# Patient Record
Sex: MALE | Race: OTHER | HISPANIC OR LATINO | URBAN - METROPOLITAN AREA
[De-identification: names, ages, dates, MRNs, and addresses within clinical notes are randomized per-mention and may not be internally consistent; named-entity substitution may affect disease eponyms.]

---

## 2017-11-02 ENCOUNTER — EMERGENCY (EMERGENCY)
Facility: HOSPITAL | Age: 57
LOS: 1 days | Discharge: HOME | End: 2017-11-02
Attending: EMERGENCY MEDICINE
Payer: MEDICAID

## 2017-11-02 VITALS
OXYGEN SATURATION: 100 % | TEMPERATURE: 98 F | HEIGHT: 64 IN | RESPIRATION RATE: 20 BRPM | HEART RATE: 57 BPM | SYSTOLIC BLOOD PRESSURE: 127 MMHG | DIASTOLIC BLOOD PRESSURE: 81 MMHG | WEIGHT: 145.06 LBS

## 2017-11-02 PROBLEM — Z00.00 ENCOUNTER FOR PREVENTIVE HEALTH EXAMINATION: Status: ACTIVE | Noted: 2017-11-02

## 2017-11-02 PROCEDURE — 93971 EXTREMITY STUDY: CPT | Mod: 26,LT

## 2017-11-02 PROCEDURE — 99284 EMERGENCY DEPT VISIT MOD MDM: CPT | Mod: 25

## 2017-11-02 PROCEDURE — 93971 EXTREMITY STUDY: CPT

## 2017-11-02 PROCEDURE — 99284 EMERGENCY DEPT VISIT MOD MDM: CPT

## 2017-11-02 PROCEDURE — T1013: CPT

## 2017-11-02 RX ORDER — METHOCARBAMOL 500 MG/1
2 TABLET, FILM COATED ORAL
Qty: 30 | Refills: 0 | OUTPATIENT
Start: 2017-11-02 | End: 2017-11-07

## 2017-11-02 RX ORDER — IBUPROFEN 200 MG
1 TABLET ORAL
Qty: 20 | Refills: 0 | OUTPATIENT
Start: 2017-11-02 | End: 2017-11-07

## 2017-11-02 RX ORDER — IBUPROFEN 200 MG
600 TABLET ORAL ONCE
Qty: 0 | Refills: 0 | Status: COMPLETED | OUTPATIENT
Start: 2017-11-02 | End: 2017-11-02

## 2017-11-02 RX ORDER — METHOCARBAMOL 500 MG/1
750 TABLET, FILM COATED ORAL ONCE
Qty: 0 | Refills: 0 | Status: COMPLETED | OUTPATIENT
Start: 2017-11-02 | End: 2017-11-02

## 2017-11-02 RX ADMIN — Medication 600 MILLIGRAM(S): at 12:15

## 2017-11-02 RX ADMIN — METHOCARBAMOL 750 MILLIGRAM(S): 500 TABLET, FILM COATED ORAL at 12:16

## 2017-11-02 NOTE — ED STATDOCS - MUSCULOSKELETAL FINDINGS, MLM
normal range of motion/motor intact/muscular tenderness to L hamstring. CALF TENDERNESS. no tenderness to buttock, lumbar, thoracic or cervical spine./neck supple/TENDERNESS

## 2017-11-02 NOTE — ED STATDOCS - OBJECTIVE STATEMENT
56 y/o M pt presents to the ED c/o LLE pain intermittent x5 days, worsening 1 day ago. Localizes the pain to the posterior L thigh. Pain radiates down his L leg. Taking acetaminophen for the pain with mild relief. Icing the location with mild relief. States that the pain worsened while at work, stands a lot during work. Denies fever, chills, SOB, back pain, numbness, tingling or any other complaints. NKDA. 56 y/o M pt presents to the ED c/o LLE pain intermittent x5 days, worsening 1 day ago.  He denies injury, fall or trauma.  The pain is localized to his calf and the posterior aspect ot the left thigh.  He has been taking tylenol for pain with mild relief.  The pain is worse when ambulating and when walking at work.  He denies fever, back pain, numbness, tingling or any other complaints. NKDA.

## 2017-11-02 NOTE — ED STATDOCS - ATTENDING CONTRIBUTION TO CARE
I, Prabha Wakefield, performed the initial face to face bedside interview with this patient regarding history of present illness, review of symptoms and relevant past medical, social and family history.  I completed an independent physical examination.  I was the initial provider who evaluated this patient.  The history, relevant review of systems, past medical and surgical history, medical decision making, and physical examination was documented by the scribe in my presence and I attest to the accuracy of the documentation.  I have signed out the follow up of any pending tests (i.e. labs, radiological studies) to the ACP.  I have communicated the patient’s plan of care and disposition with the ACP.

## 2017-11-02 NOTE — ED ADULT TRIAGE NOTE - CHIEF COMPLAINT QUOTE
Patient arrived to ED today with c/o pain from his left buttocks to his left leg.  Patient denies injury.

## 2017-11-02 NOTE — ED STATDOCS - PROGRESS NOTE DETAILS
Pt US negative for DVT. Pt states that he feels a lot better with pain medication . Pt will continue with pain medication and F/u with PCP or medical clinic as discussed.

## 2017-11-02 NOTE — ED STATDOCS - CARE PLAN
Principal Discharge DX:	Musculoskeletal thigh pain, left  Instructions for follow-up, activity and diet:	Continue with medication as discussed

## 2019-03-19 NOTE — ED STATDOCS - CARDIAC, MLM
----- Message from Delia Saunders sent at 3/19/2019 11:25 AM CDT -----  Contact: Pt  Pt is requesting that all his medication be sent to [facility] mail order says it is cheaper     Pt can be reached at [phone]       normal rate, regular rhythm, and no murmur.

## 2019-05-20 ENCOUNTER — EMERGENCY (EMERGENCY)
Facility: HOSPITAL | Age: 59
LOS: 1 days | Discharge: HOME | End: 2019-05-20
Attending: EMERGENCY MEDICINE
Payer: MEDICAID

## 2019-05-20 VITALS
TEMPERATURE: 99 F | OXYGEN SATURATION: 99 % | WEIGHT: 164.91 LBS | DIASTOLIC BLOOD PRESSURE: 82 MMHG | SYSTOLIC BLOOD PRESSURE: 139 MMHG | HEIGHT: 62.99 IN | RESPIRATION RATE: 18 BRPM | HEART RATE: 61 BPM

## 2019-05-20 PROCEDURE — 99283 EMERGENCY DEPT VISIT LOW MDM: CPT | Mod: 25

## 2019-05-20 PROCEDURE — 99283 EMERGENCY DEPT VISIT LOW MDM: CPT

## 2019-05-20 PROCEDURE — 96372 THER/PROPH/DIAG INJ SC/IM: CPT

## 2019-05-20 PROCEDURE — T1013: CPT

## 2019-05-20 RX ORDER — LACTOBACILLUS ACIDOPHILUS 100MM CELL
2 CAPSULE ORAL
Qty: 28 | Refills: 0
Start: 2019-05-20 | End: 2019-06-02

## 2019-05-20 RX ORDER — IBUPROFEN 200 MG
1 TABLET ORAL
Qty: 20 | Refills: 0
Start: 2019-05-20 | End: 2019-05-24

## 2019-05-20 RX ORDER — KETOROLAC TROMETHAMINE 30 MG/ML
60 SYRINGE (ML) INJECTION ONCE
Refills: 0 | Status: DISCONTINUED | OUTPATIENT
Start: 2019-05-20 | End: 2019-05-20

## 2019-05-20 RX ADMIN — Medication 60 MILLIGRAM(S): at 15:44

## 2019-05-20 RX ADMIN — Medication 300 MILLIGRAM(S): at 15:44

## 2019-05-20 NOTE — ED ADULT NURSE NOTE - OBJECTIVE STATEMENT
assumed pt care at 1418.  pt reports left sided pain in his tooth/neck/ear and head X 3 weeks. Can't sleep due to pain.

## 2019-05-20 NOTE — ED STATDOCS - OBJECTIVE STATEMENT
This is a 59 year old male with c/o L lower molar dental pain x 3 weeks.  He reports was evaluated by dentist and was told needed a tooth extracted.  He notes was placed on antibiotics initially after evaluation and completed course doesn't remember the name.  He reports was unable to tolerate tooth removal and has differed getting procedure done.  He notes was pain free up until today.  He denies any fevers, chills, n/v/d or any sick contacts, recent travel or rashes.

## 2019-05-20 NOTE — ED STATDOCS - NSFOLLOWUPCLINICS_GEN_ALL_ED_FT
[facility]  [place] [address]  Phone: [phone]  Fax:   Follow Up Time:

## 2019-05-20 NOTE — ED STATDOCS - ATTENDING CONTRIBUTION TO CARE
I personally saw the patient with the PA, and completed the key components of the history and physical exam. I then discussed the management plan with the PA.    Agree with HPI as above.      Pt has a L lower posterior molar with  + dental caries. No gingival erythema/ swelling or tenderness; No facial swelling or LAD. No trismus.     well appearing male c/o dentalgia; s/p course of antibiotics; will reinitiate antibiotics, provide dental block for pain and refer back to dental. Pt has f/u appointment with dentist in 2 days

## 2019-10-08 NOTE — ED ADULT TRIAGE NOTE - NS ED NURSE BANDS TYPE
Name band;
PT/OT/pain mgmt  DVT prophylaxis- as per ortho  Abx as per SCIP  Incentive spirometry  Prophylaxis of opioid  induced constipation.

## 2020-04-09 ENCOUNTER — EMERGENCY (EMERGENCY)
Facility: HOSPITAL | Age: 60
LOS: 1 days | Discharge: HOME | End: 2020-04-09
Payer: MEDICAID

## 2020-04-09 VITALS
SYSTOLIC BLOOD PRESSURE: 114 MMHG | HEIGHT: 62 IN | HEART RATE: 76 BPM | OXYGEN SATURATION: 97 % | DIASTOLIC BLOOD PRESSURE: 66 MMHG | RESPIRATION RATE: 20 BRPM | WEIGHT: 169.98 LBS | TEMPERATURE: 101 F

## 2020-04-09 PROCEDURE — 93005 ELECTROCARDIOGRAM TRACING: CPT

## 2020-04-09 PROCEDURE — 99284 EMERGENCY DEPT VISIT MOD MDM: CPT

## 2020-04-09 PROCEDURE — 93010 ELECTROCARDIOGRAM REPORT: CPT

## 2020-04-09 PROCEDURE — 99283 EMERGENCY DEPT VISIT LOW MDM: CPT

## 2020-04-09 PROCEDURE — T1013: CPT

## 2020-04-09 RX ORDER — ACETAMINOPHEN 500 MG
650 TABLET ORAL ONCE
Refills: 0 | Status: DISCONTINUED | OUTPATIENT
Start: 2020-04-09 | End: 2020-04-14

## 2020-04-09 RX ORDER — CLARITHROMYCIN 500 MG
1 TABLET ORAL
Qty: 1 | Refills: 0
Start: 2020-04-09 | End: 2020-04-09

## 2020-04-09 NOTE — ED PROVIDER NOTE - OBJECTIVE STATEMENT
59 y/o male with no known past medical history presents c/o fever, body aches, and "chest congestion" x one week. PT reports fever on and off x one week that resolve with Motrin at home. He reports feeling mucous in his chest and occasional chest discomfort that is resolved when he coughs up some mucous. He denies HA, dizziness, neck pain, sob, palpitations, diaphoresis, syncope, abdominal pain or n/v/d.  Reports his sone was sick with a febrile illness at home but he is doing better.

## 2020-04-09 NOTE — ED PROVIDER NOTE - PATIENT PORTAL LINK FT
You can access the FollowMyHealth Patient Portal offered by [facility] by registering at the following website: [email or web address]. By joining [employer]’s FollowMyHealth portal, you will also be able to view your health information using other applications (apps) compatible with our system.

## 2020-04-09 NOTE — ED PROVIDER NOTE - CLINICAL SUMMARY MEDICAL DECISION MAKING FREE TEXT BOX
59 y/o male with likely viral illness, will obtain EKG due to "chest congestion" , anti pyretics, likely d/c with COVID precautions      02 sat after exertion 97% RA   Patient was given preprinted [facility] Exitcare instructions for URI and Covid information.    Verbal instructions were given to patient and answered all questions. patient verbalizes understanding

## 2020-11-28 NOTE — ED ADULT NURSE NOTE - FINAL NURSING ELECTRONIC SIGNATURE
[facility] Emergency Dept  [address] [address]  Phone: [phone]  Fax: [phone]                                    Vini Freedman   MRN: [id number]    Department: [facility] Emergency Dept   Date of Visit: 11/28/2020           After Visit Summary Signature Page    I have received my discharge instructions, and my questions have been answered. I have discussed any challenges I see with this plan with the nurse or doctor.    ..........................................................................................................................................  Patient/Patient Representative Signature      ..........................................................................................................................................  Patient Representative Print Name and Relationship to Patient    ..................................................               ................................................  Date                                   Time    ..........................................................................................................................................  Reviewed by Signature/Title    ...................................................              ..............................................  Date                                               Time          22EPIC Rev 08/18        02-Nov-2017 14:57

## 2021-12-07 NOTE — ED STATDOCS - CONSTITUTIONAL, MLM
Per [facility] f/u w/Jennifer Agosto scheduled 12/15 @ 2:40 for s/p coronary angiogram   - - - normal... well appearing, well nourished, and in no apparent distress.

## 2023-06-27 ENCOUNTER — EMERGENCY (EMERGENCY)
Facility: HOSPITAL | Age: 63
LOS: 1 days | Discharge: HOME | End: 2023-06-27
Attending: STUDENT IN AN ORGANIZED HEALTH CARE EDUCATION/TRAINING PROGRAM
Payer: COMMERCIAL

## 2023-06-27 VITALS
HEIGHT: 62.99 IN | SYSTOLIC BLOOD PRESSURE: 172 MMHG | TEMPERATURE: 98 F | RESPIRATION RATE: 20 BRPM | DIASTOLIC BLOOD PRESSURE: 95 MMHG | WEIGHT: 145.06 LBS | HEART RATE: 68 BPM | OXYGEN SATURATION: 98 %

## 2023-06-27 PROCEDURE — 99284 EMERGENCY DEPT VISIT MOD MDM: CPT

## 2023-06-27 PROCEDURE — 99053 MED SERV 10PM-8AM 24 HR FAC: CPT
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
## 2023-06-28 PROCEDURE — 99284 EMERGENCY DEPT VISIT MOD MDM: CPT

## 2023-06-28 PROCEDURE — 73140 X-RAY EXAM OF FINGER(S): CPT | Mod: 26,LT

## 2023-06-28 PROCEDURE — 73140 X-RAY EXAM OF FINGER(S): CPT

## 2023-06-28 PROCEDURE — 72125 CT NECK SPINE W/O DYE: CPT | Mod: 26,MA

## 2023-06-28 PROCEDURE — 72125 CT NECK SPINE W/O DYE: CPT | Mod: MA

## 2023-06-28 RX ORDER — LIDOCAINE 4 G/100G
1 CREAM TOPICAL ONCE
Refills: 0 | Status: COMPLETED | OUTPATIENT
Start: 2023-06-28 | End: 2023-06-28

## 2023-06-28 RX ORDER — IBUPROFEN 200 MG
600 TABLET ORAL ONCE
Refills: 0 | Status: COMPLETED | OUTPATIENT
Start: 2023-06-28 | End: 2023-06-28

## 2023-06-28 RX ORDER — ACETAMINOPHEN 500 MG
975 TABLET ORAL ONCE
Refills: 0 | Status: COMPLETED | OUTPATIENT
Start: 2023-06-28 | End: 2023-06-28

## 2023-06-28 RX ORDER — METHOCARBAMOL 500 MG/1
2 TABLET, FILM COATED ORAL
Qty: 30 | Refills: 0
Start: 2023-06-28 | End: 2023-07-02

## 2023-06-28 RX ORDER — IBUPROFEN 200 MG
1 TABLET ORAL
Qty: 28 | Refills: 0
Start: 2023-06-28 | End: 2023-07-04

## 2023-06-28 RX ORDER — METHOCARBAMOL 500 MG/1
1500 TABLET, FILM COATED ORAL ONCE
Refills: 0 | Status: COMPLETED | OUTPATIENT
Start: 2023-06-28 | End: 2023-06-28

## 2023-06-28 RX ADMIN — Medication 975 MILLIGRAM(S): at 02:20

## 2023-06-28 RX ADMIN — LIDOCAINE 1 PATCH: 4 CREAM TOPICAL at 02:19

## 2023-06-28 RX ADMIN — Medication 600 MILLIGRAM(S): at 02:20

## 2023-06-28 RX ADMIN — METHOCARBAMOL 1500 MILLIGRAM(S): 500 TABLET, FILM COATED ORAL at 02:20

## 2023-06-28 NOTE — ED PROVIDER NOTE - PATIENT PORTAL LINK FT
You can access the FollowMyHealth Patient Portal offered by [facility] by registering at the following website: [email or web address]. By joining [employer]’s FollowMyHealth portal, you will also be able to view your health information using other applications (apps) compatible with our system.

## 2023-06-28 NOTE — ED PROVIDER NOTE - NSFOLLOWUPINSTRUCTIONS_ED_ALL_ED_FT
- Prescription sent to pharmacy.  - Ibuprofen 600mg every 6 hours as needed for pain.  - Acetaminophen 650mg every 6 hours as needed for pain.   - Please bring all documentation from your ED visit to any related future follow up appointment.  - Please call to schedule follow up appointment with your primary care physician within 24-48 hours.  - Please seek immediate medical attention for any new/worsening, signs/symptoms, or concerns.    Feel better!    - Receta enviada a farmacia.  - Ibuprofeno 600mg cada 6 horas según necesidad para el dolor.  - Acetaminofén 650 mg cada 6 horas según sea necesario para el dolor.   - Traiga toda la documentación de morris visita al servicio de urgencias a cualquier zane de seguimiento futura relacionada.  - Llame para programar edson zane de seguimiento con morris médico de atención primaria dentro de las 24 a 48 horas.  - Busque atención médica inmediata ante cualquier nuevo / empeoramiento, signos / síntomas o inquietudes.    ¡Sentirse mejor!       Accidente automovilístico    LO QUE NECESITA SABER:    Los accidentes automovilísticos pueden causar lesiones ocasionadas por el impacto o por monique sido movido de un lado al otro dentro del kev. Podría tener un hematoma en el abdomen, pecho o shahab debido al cinturón de seguridad. También puede que tenga dolor en morris heather, shahab o espalda. Podría sentir dolor en las rodillas, caderas o muslos si morris cuerpo golpea el tablero o el volante. El dolor muscular tiende a empeorar de 1 a 2 días después del accidente.    INSTRUCCIONES SOBRE EL DEBBIE HOSPITALARIA:    Llame al número de emergencias local (911 en los [place]) si:  •Usted tiene un nuevo dolor de pecho o éste empeora, o tiene falta de aliento.          Llame a morris médico si:  •Usted tiene un dolor nuevo o peor en el abdomen.      •Usted tiene náuseas y vómitos que no mejoran.      •Usted tiene un janine dolor de beatriz.      •Usted tiene debilidad, hormigueo o adormecimiento en foreign brazos o piernas.      •Usted tiene un dolor nuevo o peor que le dificulta el movimiento.      •Usted tiene dolor que aparece de 2 a 3 días después del accidente.      •Usted tiene preguntas o inquietudes acerca de morris condición o cuidado.      Medicamentos:  •Analgésicos:Usted podría recibir medicamento para quitarle o reducir el dolor. No espere a que el dolor sea muy intenso para tylor el medicamento.      •Los CHAVEZ,gilmar el ibuprofeno, ayudan a disminuir la inflamación, el dolor y la fiebre. Sierra medicamento está disponible con o sin edson receta médica. Los CHAVEZ pueden causar sangrado estomacal o problemas renales en ciertas personas. Si usted esta tomando un anticoágulante,  siempre pregunte si los AINEs son seguros para usted. Siempre min la etiqueta de sierra medicamento y siga las instrucciones. No administre sierra medicamento a niños menores de 6 meses de saurav sin antes obtener la autorización de morrsi médico.      •[place] foreign medicamentos gilmar se le haya indicado.Consulte con morris médico si usted george que morris medicamento no le está ayudando o si presenta efectos secundarios. Infórmele si es alérgico a algún medicamento. Mantenga edson lista actualizada de los medicamentos, las vitaminas y los productos herbales que marlys. Incluya los siguientes datos de los medicamentos: cantidad, frecuencia y motivo de administración. Traiga con usted la lista o los envases de las píldoras a foreign citas de seguimiento. Lleve la lista de los medicamentos con usted en hamida de edson emergencia.      Cuidados personales:  •Use hielo y calor.El hielo ayuda a disminuir la inflamación y el dolor. El hielo también puede contribuir a evitar el daño de los tejidos. Use edson compresa de hielo o ponga hielo triturado en edson bolsa de plástico. Cúbrala con edson toalla y aplíquela al área adolorida por 15 a 20 minutos cada hora o gilmar se le indique. Después de 2 días, use edson compresa caliente en el área lesionada. Aplique calor gilmar se lo recomiende el médico.      •Estire foreign músculos cuidadosamente.Cheri ejercicios suaves para estirar foreign músculos después de monique sufrido un accidente automovilístico. Consulte con morris médico sobre cuáles ejercicios hacer.      Consejos de seguridad:Lo siguiente puede ayudar a prevenir otro accidente automovilístico o a reducir el riesgo de lesiones:   •Use siempre morris cinturón de seguridad.El uso de morris cinturón de seguridad ayudará a reducir las lesiones sufridas por accidentes automovilísticos. El cinturón de seguridad debe tener edson carney que atraviese morris pecho y otra que atraviese morris regazo.      •Siempre coloque a morris hijo en un asiento de seguridad para niños.Use un asiento de seguridad hecho para morris edad, altura y peso. Elija un asiento de seguridad que tenga un arnés y un broche. Coloque el asiento de seguridad en la plaza del medio del asiento trasero del automóvil. El asiento de seguridad no debería moverse en ninguna dirección más de 1 pulgada después de ajustarlo. Siga siempre las instrucciones proporcionadas para morris asiento de seguridad para ayudarle a colocarlo. Las instrucciones también le indicarán cómo sujetar a morris alissa en el asiento correctamente. Pregúntele a morris médico sobre más información acerca de los asientos de seguridad para niños.   Asiento de seguridad para niños en automóviles           •Disminuya la velocidad.Maneje morris kev al límite de velocidad para reducir morris riesgo de accidentes automovilísticos.      •No maneje si se siente cansado.Usted reacciona más lentamente cuando está cansado. El tiempo de reacción lento aumentará el riesgo de un accidente automovilístico.      •No hable por teléfono ni envíe mensajes de texto mientras maneje.Usted no reaccionará lo suficientemente rápido en edson emergencia si se distrae con mensajes de texto o conversaciones.      •No consuma drogas ni alcohol antes de manejar.Es probable que se sienta más cansado o tome riesgos que usualmente no tomaría. No maneje después de tylor medicamentos que le dan sueño. Use un conductor designado o cheri arreglos para que lo lleven a morris casa.      •Ayude a morris hijo adolescente a convertirse en un conductor seguro.Sea un buen modelo al manejar. Hable con morris hijo adolescente sobre las maneras de reducir el riesgo de un accidente automovilístico. Estas incluyen no conducir cuando está cansado y no tener distracciones, gilmar un teléfono. Recuérdele a morris hijo adolescente que siempre debe ir al límite de velocidad y usar el cinturón de seguridad.      Acuda a foreign consultas de control con morris médico según le indicaron.Anote foreign preguntas para que se acuerde de hacerlas juan foreign visitas.         Dolor musculoesquelético    Musculoskeletal Pain      "Dolor musculoesquelético" hace referencia a los sharmila y las molestias en los huesos, las articulaciones, los músculos y los tejidos que los rodean. Sierra dolor puede ocurrir en cualquier parte del cuerpo. Puede durar un breve período (danilo) o prolongarse mucho tiempo (crónico).    Es posible que se realicen un examen físico, análisis de laboratorio y estudios de diagnóstico por imágenes para encontrar la causa del dolor musculoesquelético.      Siga estas instrucciones en morris casa:    Estilo de saurav   •Trate de controlar o reducir los niveles de estrés. El estrés aumenta la tensión muscular y puede empeorar el dolor musculoesquelético. Es importante reconocer cuando está ansioso o estresado y aprender distintas formas de controlar sierra estado. Theresa puede incluir:  •Yoga o meditación.      •Terapia cognitiva o conductual.      •Acupuntura o terapia de masajes.        •Podrá seguir con todas las actividades, a menos que estas le generen más dolor. Cuando el dolor disminuya, retome las actividades habituales de a poco. Aumente gradualmente la intensidad y la duración de las actividades o del ejercicio que realice.        Control del dolor, la rigidez y la hinchazón                   •El tratamiento puede incluir medicamentos para el dolor y la inflamación que se kwesi por boca o que se aplican sobre la piel. Use los medicamentos de venta roslyn y los recetados solamente gilmar se lo haya indicado el médico.      •Si el dolor es intenso, el reposo en cama puede ser beneficioso. Acuéstese o siéntese en cualquier posición que sea cómoda, stephen salga de la cama y camine al menos cada dos horas.    •Si se lo indican, aplique calor en la chago afectada con la frecuencia que le haya indicado el médico. Use la tracy de calor que el médico le recomiende, gilmar edson compresa de calor húmedo o edson almohadilla térmica.  •Coloque edson toalla entre la piel y la tracy de calor.      •Aplique calor juan 20 a 30 minutos.      •Retire la tracy de calor si la piel se pone de color reynolds brillante. Theresa es especialmente importante si no puede sentir dolor, calor o frío. Puede correr un riesgo mayor de sufrir quemaduras.      •Si se lo indican, aplique hielo sobre la chago dolorida. Para hacer esto:  •Ponga el hielo en edson bolsa plástica.      •Coloque edson toalla entre la piel y la bolsa.      •Aplique el hielo juan 20 minutos, 2 o 3 veces por día.      •Retire el hielo si la piel se pone de color reynolds brillante. Theresa es muy importante. Si no puede sentir dolor, calor o frío, tiene un mayor riesgo de que se dañe la chago.        Indicaciones generales     •El médico puede recomendarle que consulte a un fisioterapeuta. Esta persona puede ayudarlo a elaborar un programa de ejercicios seguro.      •Si se lo indica el médico, cheri ejercicios de fisioterapia para mejorar el movimiento y la fuerza de la chago afectada.      •Cumpla con todas las visitas de seguimiento. Theresa es importante. Theresa incluye las visitas al fisioterapeuta.        Comuníquese con un médico si:    •El dolor empeora.      •Los medicamentos no alivian el dolor.      •No puede usar la parte del cuerpo que le duele, gilmar un brazo, edson pierna o el shahab.      •Tiene dificultad para dormir.      •Tiene dificultad para realizar las actividades cotidianas.        Solicite ayuda de inmediato si:    •Tiene edson nueva lesión o el dolor empeora o es diferente.      •Tiene adormecimiento u hormigueo en la chago dolorida.        Resumen    •"Dolor musculoesquelético" hace referencia a los sharmila y las molestias en los huesos, las articulaciones, los músculos y los tejidos que los rodean.      •Sierra dolor puede ocurrir en cualquier parte del cuerpo.      •El médico puede recomendarle que consulte a un fisioterapeuta. Esta persona puede ayudarlo a elaborar un programa de ejercicios seguro. Cheri ejercicios gilmar se lo haya indicado el fisioterapeuta.      •Disminuya los niveles de estrés. El estrés puede empeorar el dolor musculoesquelético. Entre los métodos para disminuir el estrés se pueden mencionar la meditación, el yoga, la terapia cognitiva o conductual, la acupuntura y la terapia de masajes.      Esta información no tiene gilmar fin reemplazar el consejo del médico. Asegúrese de hacerle al médico cualquier pregunta que tenga.
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
## 2023-06-28 NOTE — ED PROVIDER NOTE - NS ED ATTENDING STATEMENT MOD
This was a shared visit with the DECLAN. I reviewed and verified the documentation and independently performed the documented:

## 2023-06-28 NOTE — ED ADULT NURSE NOTE - NS ED NOTE ABUSE RESPONSE YN
Spine appears normal, range of motion is not limited, no muscle or joint tenderness Unable to assess due to medical condition
